# Patient Record
Sex: MALE | Race: WHITE | NOT HISPANIC OR LATINO | URBAN - METROPOLITAN AREA
[De-identification: names, ages, dates, MRNs, and addresses within clinical notes are randomized per-mention and may not be internally consistent; named-entity substitution may affect disease eponyms.]

---

## 2017-11-09 ENCOUNTER — EMERGENCY (EMERGENCY)
Facility: HOSPITAL | Age: 72
LOS: 1 days | Discharge: HOME | End: 2017-11-09
Attending: INTERNAL MEDICINE | Admitting: INTERNAL MEDICINE
Payer: MEDICARE

## 2017-11-09 VITALS
SYSTOLIC BLOOD PRESSURE: 150 MMHG | HEART RATE: 83 BPM | RESPIRATION RATE: 16 BRPM | OXYGEN SATURATION: 97 % | TEMPERATURE: 98 F | HEIGHT: 70 IN | DIASTOLIC BLOOD PRESSURE: 79 MMHG | WEIGHT: 190.04 LBS

## 2017-11-09 DIAGNOSIS — Z98.89 OTHER SPECIFIED POSTPROCEDURAL STATES: Chronic | ICD-10-CM

## 2017-11-09 PROCEDURE — 99283 EMERGENCY DEPT VISIT LOW MDM: CPT | Mod: 25

## 2017-11-09 NOTE — ED ADULT NURSE NOTE - PMH
Anxiety    Childhood asthma  resolved  Elbow effusion  left  GERD (gastroesophageal reflux disease)    Hypercholesteremia    Hyperlipidemia    Insomnia    Seasonal allergic rhinitis

## 2017-11-10 PROCEDURE — 74020: CPT | Mod: 26

## 2017-11-10 PROCEDURE — 99283 EMERGENCY DEPT VISIT LOW MDM: CPT | Mod: 25

## 2017-11-10 PROCEDURE — 74020: CPT

## 2017-11-10 NOTE — ED PROVIDER NOTE - FAMILY HISTORY
Father  Still living? Yes, Estimated age:   Family history of dementia, Age at diagnosis: Age Unknown     Mother  Still living? Yes, Estimated age:   Family history of hypertension, Age at diagnosis: Age Unknown     Sibling  Still living? Yes, Estimated age: 61-70  Family history of depression, Age at diagnosis: Age Unknown

## 2017-11-10 NOTE — ED PROVIDER NOTE - OBJECTIVE STATEMENT
70 y/o w/o with difficulty moving his bowels after holding his bowels after a long drive, he feels impacted, no abdominal pain, no N/V, no GIB. Did not get good result with fleets

## 2017-11-10 NOTE — ED PROVIDER NOTE - PSH
S/P colonoscopy  2 months ago  S/P knee surgery  left knee 2000  right knee 1997 & 2002  S/P shoulder surgery  2009 rotator cuff and repeat 2010 post injury  S/P tonsillectomy and adenoidectomy  as a child

## 2018-03-07 NOTE — ED PROVIDER NOTE - CROS ED ROS STATEMENT
1. Have you been to the ER, urgent care clinic, or been hospitalized since your last visit? No     2. Have you seen or consulted any other health care providers outside of the [address] [place] [address] since your last visit?    No       Reviewed record in preparation for visit and have necessary documentation  opportunity was given for questions  Goals that were addressed and/or need to be completed during or after this appointment include     Health Maintenance Due   Topic Date Due    DTaP/Tdap/Td series (1 - Tdap) 02/22/2002    PAP AKA CERVICAL CYTOLOGY  02/22/2002    Influenza Age 9 to Adult  08/01/2017 all other ROS negative except as per HPI

## 2018-08-20 NOTE — ED PROVIDER NOTE - PRINCIPAL DIAGNOSIS

## 2019-01-17 ENCOUNTER — APPOINTMENT (OUTPATIENT)
Dept: CARDIOLOGY | Facility: CLINIC | Age: 74
End: 2019-01-17

## 2020-07-27 DIAGNOSIS — Z78.9 OTHER SPECIFIED HEALTH STATUS: ICD-10-CM

## 2020-07-27 DIAGNOSIS — J45.909 UNSPECIFIED ASTHMA, UNCOMPLICATED: ICD-10-CM

## 2020-07-27 RX ORDER — LEVOCETIRIZINE DIHYDROCHLORIDE 5 MG/1
5 TABLET ORAL
Refills: 0 | Status: ACTIVE | COMMUNITY

## 2020-07-27 RX ORDER — ZOLPIDEM TARTRATE 12.5 MG/1
12.5 TABLET, EXTENDED RELEASE ORAL
Refills: 0 | Status: ACTIVE | COMMUNITY

## 2020-07-27 RX ORDER — OMEPRAZOLE 40 MG/1
40 CAPSULE, DELAYED RELEASE ORAL
Refills: 0 | Status: ACTIVE | COMMUNITY

## 2020-07-27 RX ORDER — CYCLOBENZAPRINE HYDROCHLORIDE 10 MG/1
10 TABLET, FILM COATED ORAL
Refills: 0 | Status: ACTIVE | COMMUNITY

## 2020-08-06 ENCOUNTER — APPOINTMENT (OUTPATIENT)
Dept: SURGERY | Facility: CLINIC | Age: 75
End: 2020-08-06
Payer: MEDICARE

## 2020-08-06 VITALS — TEMPERATURE: 97.2 F

## 2020-08-06 DIAGNOSIS — R79.89 OTHER SPECIFIED ABNORMAL FINDINGS OF BLOOD CHEMISTRY: ICD-10-CM

## 2020-08-06 PROCEDURE — 99214 OFFICE O/P EST MOD 30 MIN: CPT

## 2020-08-06 NOTE — PHYSICAL EXAM
[JVD] : no jugular venous distention  [Carotid Bruits] : no carotid bruits [Normal Heart Sounds] : normal heart sounds [Normal Breath Sounds] : Normal breath sounds [Abdominal Masses] : No abdominal masses [Abdomen Tenderness] : ~T ~M No abdominal tenderness [Enlarged] : not enlarged [Tender] : was nontender [Stool Sample Taken] : A stool sample was obtained  [Occult Blood Positive] : was negative for occult blood [Gross Blood] : no gross blood [Fecal Impaction] : no fecal impaction [Alert] : alert [No Rash or Lesion] : No rash or lesion [Oriented to Time] : oriented to time [Oriented to Place] : oriented to place [Calm] : calm [Oriented to Person] : oriented to person [de-identified] : benign [de-identified] : without adenopathy [de-identified] : noninjected and nonicteric [de-identified] : well developed white male in no acute distress [de-identified] : without masses [de-identified] : normal testicles, without masses, right inguinal hernia, left side normal

## 2020-08-06 NOTE — REVIEW OF SYSTEMS
[Dysuria] : no dysuria [Incontinence] : no incontinence [Hesitancy] : no urinary hesitancy [Nocturia] : no nocturia [Genital Lesion] : no genital lesions [Testicular Pain] : no testicular pain [Negative] : Heme/Lymph [FreeTextEntry8] : right groin pain

## 2020-08-06 NOTE — HISTORY OF PRESENT ILLNESS
[de-identified] : 74 year old white male with a few month history of right groin pain, c/o increased pain.  His pain was localized, without radiation.  It increased with activity.  Denies alleviating factors. [de-identified] : right groin pain

## 2020-08-17 ENCOUNTER — OUTPATIENT (OUTPATIENT)
Dept: OUTPATIENT SERVICES | Facility: HOSPITAL | Age: 75
LOS: 1 days | End: 2020-08-17
Payer: MEDICARE

## 2020-08-17 VITALS
SYSTOLIC BLOOD PRESSURE: 99 MMHG | RESPIRATION RATE: 16 BRPM | HEART RATE: 67 BPM | HEIGHT: 70 IN | OXYGEN SATURATION: 98 % | TEMPERATURE: 98 F | DIASTOLIC BLOOD PRESSURE: 62 MMHG | WEIGHT: 166.01 LBS

## 2020-08-17 DIAGNOSIS — K40.90 UNILATERAL INGUINAL HERNIA, WITHOUT OBSTRUCTION OR GANGRENE, NOT SPECIFIED AS RECURRENT: ICD-10-CM

## 2020-08-17 DIAGNOSIS — Z98.89 OTHER SPECIFIED POSTPROCEDURAL STATES: Chronic | ICD-10-CM

## 2020-08-17 DIAGNOSIS — Z98.890 OTHER SPECIFIED POSTPROCEDURAL STATES: Chronic | ICD-10-CM

## 2020-08-17 DIAGNOSIS — Z01.818 ENCOUNTER FOR OTHER PREPROCEDURAL EXAMINATION: ICD-10-CM

## 2020-08-17 LAB
ANION GAP SERPL CALC-SCNC: 8 MMOL/L — SIGNIFICANT CHANGE UP (ref 5–17)
APPEARANCE UR: CLEAR — SIGNIFICANT CHANGE UP
BILIRUB UR-MCNC: NEGATIVE — SIGNIFICANT CHANGE UP
BUN SERPL-MCNC: 39 MG/DL — HIGH (ref 7–23)
CALCIUM SERPL-MCNC: 9.5 MG/DL — SIGNIFICANT CHANGE UP (ref 8.5–10.1)
CHLORIDE SERPL-SCNC: 103 MMOL/L — SIGNIFICANT CHANGE UP (ref 96–108)
CO2 SERPL-SCNC: 28 MMOL/L — SIGNIFICANT CHANGE UP (ref 22–31)
COLOR SPEC: YELLOW — SIGNIFICANT CHANGE UP
CREAT SERPL-MCNC: 1.7 MG/DL — HIGH (ref 0.5–1.3)
DIFF PNL FLD: NEGATIVE — SIGNIFICANT CHANGE UP
GLUCOSE SERPL-MCNC: 104 MG/DL — HIGH (ref 70–99)
GLUCOSE UR QL: NEGATIVE — SIGNIFICANT CHANGE UP
HCT VFR BLD CALC: 36.6 % — LOW (ref 39–50)
HGB BLD-MCNC: 12.4 G/DL — LOW (ref 13–17)
KETONES UR-MCNC: NEGATIVE — SIGNIFICANT CHANGE UP
LEUKOCYTE ESTERASE UR-ACNC: NEGATIVE — SIGNIFICANT CHANGE UP
MCHC RBC-ENTMCNC: 30.2 PG — SIGNIFICANT CHANGE UP (ref 27–34)
MCHC RBC-ENTMCNC: 33.9 GM/DL — SIGNIFICANT CHANGE UP (ref 32–36)
MCV RBC AUTO: 89.3 FL — SIGNIFICANT CHANGE UP (ref 80–100)
NITRITE UR-MCNC: NEGATIVE — SIGNIFICANT CHANGE UP
NRBC # BLD: 0 /100 WBCS — SIGNIFICANT CHANGE UP (ref 0–0)
PH UR: 5 — SIGNIFICANT CHANGE UP (ref 5–8)
PLATELET # BLD AUTO: 262 K/UL — SIGNIFICANT CHANGE UP (ref 150–400)
POTASSIUM SERPL-MCNC: 4.6 MMOL/L — SIGNIFICANT CHANGE UP (ref 3.5–5.3)
POTASSIUM SERPL-SCNC: 4.6 MMOL/L — SIGNIFICANT CHANGE UP (ref 3.5–5.3)
PROT UR-MCNC: NEGATIVE — SIGNIFICANT CHANGE UP
RBC # BLD: 4.1 M/UL — LOW (ref 4.2–5.8)
RBC # FLD: 13.6 % — SIGNIFICANT CHANGE UP (ref 10.3–14.5)
SODIUM SERPL-SCNC: 139 MMOL/L — SIGNIFICANT CHANGE UP (ref 135–145)
SP GR SPEC: 1.02 — SIGNIFICANT CHANGE UP (ref 1.01–1.02)
UROBILINOGEN FLD QL: NEGATIVE — SIGNIFICANT CHANGE UP
WBC # BLD: 8.3 K/UL — SIGNIFICANT CHANGE UP (ref 3.8–10.5)
WBC # FLD AUTO: 8.3 K/UL — SIGNIFICANT CHANGE UP (ref 3.8–10.5)

## 2020-08-17 PROCEDURE — 86901 BLOOD TYPING SEROLOGIC RH(D): CPT

## 2020-08-17 PROCEDURE — 93005 ELECTROCARDIOGRAM TRACING: CPT

## 2020-08-17 PROCEDURE — G0463: CPT

## 2020-08-17 PROCEDURE — 85027 COMPLETE CBC AUTOMATED: CPT

## 2020-08-17 PROCEDURE — 80048 BASIC METABOLIC PNL TOTAL CA: CPT

## 2020-08-17 PROCEDURE — 86900 BLOOD TYPING SEROLOGIC ABO: CPT

## 2020-08-17 PROCEDURE — 86850 RBC ANTIBODY SCREEN: CPT

## 2020-08-17 PROCEDURE — 81003 URINALYSIS AUTO W/O SCOPE: CPT

## 2020-08-17 PROCEDURE — 36415 COLL VENOUS BLD VENIPUNCTURE: CPT

## 2020-08-17 PROCEDURE — 93010 ELECTROCARDIOGRAM REPORT: CPT

## 2020-08-17 RX ORDER — GEMFIBROZIL 600 MG
1 TABLET ORAL
Qty: 0 | Refills: 0 | DISCHARGE

## 2020-08-17 RX ORDER — OMEPRAZOLE 10 MG/1
1 CAPSULE, DELAYED RELEASE ORAL
Qty: 0 | Refills: 0 | DISCHARGE

## 2020-08-17 NOTE — H&P PST ADULT - NSICDXPROBLEM_GEN_ALL_CORE_FT
PROBLEM DIAGNOSES  Problem: Unilateral inguinal hernia, without obstruction or gangrene, not specified as recurrent  Assessment and Plan: scheduled for a laparoscopic right inguinal hernia repair on 9/4 with Dr. Roberts.       Problem: Pre-op evaluation  Assessment and Plan: Labs - CBC, BMP, UA, T&S and EKG. To be swabbed for COVID @ [facility]. Appt DANYELLED     with Sandro Polk. CC with Dr. Rowe  Pre op and Hibiclens instructions reviewed and given. Take routine am meds DOS with sip of water. Instructed to avoid NSAIDs and OTC supplements. Verbalized understanding

## 2020-08-17 NOTE — H&P PST ADULT - NSICDXPASTMEDICALHX_GEN_ALL_CORE_FT
PAST MEDICAL HISTORY:  Anxiety     Childhood asthma resolved    Elbow effusion left    GERD (gastroesophageal reflux disease)     Hypercholesteremia     Hyperlipidemia     Insomnia     Seasonal allergic rhinitis     Unilateral inguinal hernia, without obstruction or gangrene, not specified as recurrent

## 2020-08-17 NOTE — H&P PST ADULT - NSICDXFAMILYHX_GEN_ALL_CORE_FT
FAMILY HISTORY:  Father  Still living? Yes, Estimated age:   Family history of dementia, Age at diagnosis: Age Unknown    Mother  Still living? Yes, Estimated age:   Family history of hypertension, Age at diagnosis: Age Unknown    Sibling  Still living? Yes, Estimated age: 61-70  Family history of depression, Age at diagnosis: Age Unknown

## 2020-08-17 NOTE — H&P PST ADULT - HISTORY OF PRESENT ILLNESS
75 yo male with HTN, HLD GERD and Seasonal allergies and Asthma, presents to PST scheduled for a laparoscopic right inguinal hernia repair on 9/4 with Dr. Roberts. C/O right inguinal pain for a couple of months. Denies swelling, abd pain, N/V and urinary symptomology.

## 2020-08-17 NOTE — H&P PST ADULT - VISION (WITH CORRECTIVE LENSES IF THE PATIENT USUALLY WEARS THEM):
+ reading glasses/Partially impaired: cannot see medication labels or newsprint, but can see obstacles in path, and the surrounding layout; can count fingers at arm's length

## 2020-08-17 NOTE — H&P PST ADULT - NSICDXPASTSURGICALHX_GEN_ALL_CORE_FT
PAST SURGICAL HISTORY:  H/O elbow surgery 2015    S/P colonoscopy > 5 years ago    S/P knee surgery left knee 2000  right knee 1997 & 2002    S/P shoulder surgery 2009 rotator cuff and repeat 2010 post injury    S/P tonsillectomy and adenoidectomy as a child

## 2020-09-01 PROBLEM — K40.90 UNILATERAL INGUINAL HERNIA, WITHOUT OBSTRUCTION OR GANGRENE, NOT SPECIFIED AS RECURRENT: Chronic | Status: ACTIVE | Noted: 2020-08-17

## 2020-09-02 ENCOUNTER — OUTPATIENT (OUTPATIENT)
Dept: OUTPATIENT SERVICES | Facility: HOSPITAL | Age: 75
LOS: 1 days | End: 2020-09-02
Payer: MEDICARE

## 2020-09-02 DIAGNOSIS — Z98.89 OTHER SPECIFIED POSTPROCEDURAL STATES: Chronic | ICD-10-CM

## 2020-09-02 DIAGNOSIS — Z11.59 ENCOUNTER FOR SCREENING FOR OTHER VIRAL DISEASES: ICD-10-CM

## 2020-09-02 DIAGNOSIS — Z98.890 OTHER SPECIFIED POSTPROCEDURAL STATES: Chronic | ICD-10-CM

## 2020-09-02 LAB — SARS-COV-2 RNA SPEC QL NAA+PROBE: SIGNIFICANT CHANGE UP

## 2020-09-02 PROCEDURE — U0003: CPT

## 2020-09-03 ENCOUNTER — TRANSCRIPTION ENCOUNTER (OUTPATIENT)
Age: 75
End: 2020-09-03

## 2020-09-03 NOTE — ASU PATIENT PROFILE, ADULT - PSH
H/O elbow surgery  2015  S/P colonoscopy  > 5 years ago  S/P knee surgery  left knee 2000  right knee 1997 & 2002  S/P shoulder surgery  2009 rotator cuff and repeat 2010 post injury  S/P tonsillectomy and adenoidectomy  as a child

## 2020-09-03 NOTE — ASU PATIENT PROFILE, ADULT - VISION (WITH CORRECTIVE LENSES IF THE PATIENT USUALLY WEARS THEM):
Partially impaired: cannot see medication labels or newsprint, but can see obstacles in path, and the surrounding layout; can count fingers at arm's length/+ reading glasses

## 2020-09-03 NOTE — ASU PATIENT PROFILE, ADULT - PMH
Anxiety    Childhood asthma  resolved  Elbow effusion  left  GERD (gastroesophageal reflux disease)    Hypercholesteremia    Hyperlipidemia    Insomnia    Seasonal allergic rhinitis    Unilateral inguinal hernia, without obstruction or gangrene, not specified as recurrent

## 2020-09-04 ENCOUNTER — RESULT REVIEW (OUTPATIENT)
Age: 75
End: 2020-09-04

## 2020-09-04 ENCOUNTER — OUTPATIENT (OUTPATIENT)
Dept: OUTPATIENT SERVICES | Facility: HOSPITAL | Age: 75
LOS: 1 days | End: 2020-09-04
Payer: MEDICARE

## 2020-09-04 ENCOUNTER — APPOINTMENT (OUTPATIENT)
Dept: SURGERY | Facility: HOSPITAL | Age: 75
End: 2020-09-04

## 2020-09-04 VITALS
TEMPERATURE: 98 F | WEIGHT: 166.01 LBS | OXYGEN SATURATION: 99 % | DIASTOLIC BLOOD PRESSURE: 53 MMHG | RESPIRATION RATE: 14 BRPM | SYSTOLIC BLOOD PRESSURE: 134 MMHG | HEIGHT: 70 IN | HEART RATE: 72 BPM

## 2020-09-04 VITALS
DIASTOLIC BLOOD PRESSURE: 58 MMHG | SYSTOLIC BLOOD PRESSURE: 138 MMHG | HEART RATE: 71 BPM | OXYGEN SATURATION: 100 % | RESPIRATION RATE: 15 BRPM

## 2020-09-04 DIAGNOSIS — Z98.89 OTHER SPECIFIED POSTPROCEDURAL STATES: Chronic | ICD-10-CM

## 2020-09-04 DIAGNOSIS — Z98.890 OTHER SPECIFIED POSTPROCEDURAL STATES: Chronic | ICD-10-CM

## 2020-09-04 DIAGNOSIS — K40.90 UNILATERAL INGUINAL HERNIA, WITHOUT OBSTRUCTION OR GANGRENE, NOT SPECIFIED AS RECURRENT: ICD-10-CM

## 2020-09-04 PROCEDURE — C1781: CPT

## 2020-09-04 PROCEDURE — 88304 TISSUE EXAM BY PATHOLOGIST: CPT

## 2020-09-04 PROCEDURE — 55520 REMOVAL OF SPERM CORD LESION: CPT | Mod: AS,59

## 2020-09-04 PROCEDURE — 49650 LAP ING HERNIA REPAIR INIT: CPT | Mod: RT

## 2020-09-04 PROCEDURE — 88304 TISSUE EXAM BY PATHOLOGIST: CPT | Mod: 26

## 2020-09-04 PROCEDURE — 49650 LAP ING HERNIA REPAIR INIT: CPT

## 2020-09-04 PROCEDURE — C1889: CPT

## 2020-09-04 PROCEDURE — 49650 LAP ING HERNIA REPAIR INIT: CPT | Mod: AS

## 2020-09-04 RX ORDER — DOCUSATE SODIUM 100 MG/1
100 CAPSULE ORAL 3 TIMES DAILY
Qty: 30 | Refills: 0 | Status: ACTIVE | COMMUNITY
Start: 2020-09-04 | End: 1900-01-01

## 2020-09-04 RX ORDER — SODIUM CHLORIDE 9 MG/ML
1000 INJECTION, SOLUTION INTRAVENOUS
Refills: 0 | Status: DISCONTINUED | OUTPATIENT
Start: 2020-09-04 | End: 2020-09-04

## 2020-09-04 RX ORDER — OXYCODONE HYDROCHLORIDE 5 MG/1
5 TABLET ORAL ONCE
Refills: 0 | Status: DISCONTINUED | OUTPATIENT
Start: 2020-09-04 | End: 2020-09-04

## 2020-09-04 RX ORDER — TAMSULOSIN HYDROCHLORIDE 0.4 MG/1
0.4 CAPSULE ORAL ONCE
Refills: 0 | Status: COMPLETED | OUTPATIENT
Start: 2020-09-04 | End: 2020-09-04

## 2020-09-04 RX ORDER — IBUPROFEN 200 MG
1 TABLET ORAL
Qty: 30 | Refills: 0
Start: 2020-09-04

## 2020-09-04 RX ORDER — CEFAZOLIN SODIUM 1 G
2000 VIAL (EA) INJECTION ONCE
Refills: 0 | Status: COMPLETED | OUTPATIENT
Start: 2020-09-04 | End: 2020-09-04

## 2020-09-04 RX ORDER — HYDROMORPHONE HYDROCHLORIDE 2 MG/ML
0.5 INJECTION INTRAMUSCULAR; INTRAVENOUS; SUBCUTANEOUS
Refills: 0 | Status: DISCONTINUED | OUTPATIENT
Start: 2020-09-04 | End: 2020-09-04

## 2020-09-04 RX ORDER — ONDANSETRON 8 MG/1
4 TABLET, FILM COATED ORAL ONCE
Refills: 0 | Status: DISCONTINUED | OUTPATIENT
Start: 2020-09-04 | End: 2020-09-04

## 2020-09-04 RX ADMIN — SODIUM CHLORIDE 75 MILLILITER(S): 9 INJECTION, SOLUTION INTRAVENOUS at 14:30

## 2020-09-04 RX ADMIN — OXYCODONE HYDROCHLORIDE 5 MILLIGRAM(S): 5 TABLET ORAL at 14:41

## 2020-09-04 RX ADMIN — TAMSULOSIN HYDROCHLORIDE 0.4 MILLIGRAM(S): 0.4 CAPSULE ORAL at 14:40

## 2020-09-04 RX ADMIN — OXYCODONE HYDROCHLORIDE 5 MILLIGRAM(S): 5 TABLET ORAL at 14:36

## 2020-09-04 RX ADMIN — SODIUM CHLORIDE 75 MILLILITER(S): 9 INJECTION, SOLUTION INTRAVENOUS at 11:03

## 2020-09-04 NOTE — ASU DISCHARGE PLAN (ADULT/PEDIATRIC) - ASU DC SPECIAL INSTRUCTIONSFT
Keep dressing clean, dry and intact x 48 hrs. Apply waterproof ice pack to incision area 20 mins on, 20 mins off to help decrease pain and swelling. After 48 hrs you may begin showering as usual but Do NOT remove dressings. Do not scrub or soak incision site. Pat dry. NO tub baths, NO swimming pools.     Call Dr. Roberts's office for an appointment for follow up in 1 week

## 2020-09-04 NOTE — ASU DISCHARGE PLAN (ADULT/PEDIATRIC) - CARE PROVIDER_API CALL
Saul Roberts)  Surgery  [address] [place] [id number]  Phone: [phone]  Fax: [phone]  Follow Up Time:

## 2020-09-04 NOTE — BRIEF OPERATIVE NOTE - NSICDXBRIEFPROCEDURE_GEN_ALL_CORE_FT
PROCEDURES:  Laparoscopic repair, inguinal hernia 04-Sep-2020 13:49:01 Laparoscopic Right inguinal hernia repair with mesh Alejandra Matthews

## 2020-09-09 LAB — SURGICAL PATHOLOGY STUDY: SIGNIFICANT CHANGE UP

## 2020-09-10 ENCOUNTER — APPOINTMENT (OUTPATIENT)
Dept: SURGERY | Facility: CLINIC | Age: 75
End: 2020-09-10
Payer: MEDICARE

## 2020-09-10 VITALS — TEMPERATURE: 97.2 F

## 2020-09-10 PROCEDURE — 99024 POSTOP FOLLOW-UP VISIT: CPT

## 2020-09-10 NOTE — PHYSICAL EXAM
[Normal Breath Sounds] : Normal breath sounds [Normal Rate and Rhythm] : normal rate and rhythm [Normal Heart Sounds] : normal heart sounds [de-identified] : minimal swelling and ecchymosis [de-identified] : well developed male in no acute distress [de-identified] : normal bowel sounds, without distension, or tenderness, with ecchymosis around umbilicus\par Incisions clean and closed [de-identified] : without calf pain or swelling

## 2020-09-10 NOTE — HISTORY OF PRESENT ILLNESS
[de-identified] : s/p laparoscopic repair of right inguinal hernia 9/4/20 [de-identified] : pt doing well, with normal bowel movements x 2.  He denies fevers, chills or nights sweats.  Voiding without difficulty.

## 2020-10-08 ENCOUNTER — APPOINTMENT (OUTPATIENT)
Dept: SURGERY | Facility: CLINIC | Age: 75
End: 2020-10-08
Payer: MEDICARE

## 2020-10-08 VITALS — TEMPERATURE: 98 F

## 2020-10-08 DIAGNOSIS — K40.30 UNILATERAL INGUINAL HERNIA, WITH OBSTRUCTION, W/OUT GANGRENE, NOT SPECIFIED AS RECURRENT: ICD-10-CM

## 2020-10-08 PROCEDURE — 99024 POSTOP FOLLOW-UP VISIT: CPT

## 2020-10-08 NOTE — HISTORY OF PRESENT ILLNESS
[de-identified] : s/p laparoscopic repair of right inguinal hernia 9/4/20 [de-identified] : pt without complaints, minimal pain, normal Gi functioning. Denies fevers, chills or night sweats

## 2020-10-23 ENCOUNTER — OUTPATIENT (OUTPATIENT)
Dept: OUTPATIENT SERVICES | Facility: HOSPITAL | Age: 75
LOS: 1 days | End: 2020-10-23
Payer: MEDICARE

## 2020-10-23 VITALS
HEIGHT: 70 IN | RESPIRATION RATE: 16 BRPM | HEART RATE: 66 BPM | DIASTOLIC BLOOD PRESSURE: 84 MMHG | SYSTOLIC BLOOD PRESSURE: 138 MMHG | OXYGEN SATURATION: 99 % | TEMPERATURE: 98 F | WEIGHT: 160.94 LBS

## 2020-10-23 DIAGNOSIS — H26.9 UNSPECIFIED CATARACT: Chronic | ICD-10-CM

## 2020-10-23 DIAGNOSIS — Z98.890 OTHER SPECIFIED POSTPROCEDURAL STATES: Chronic | ICD-10-CM

## 2020-10-23 DIAGNOSIS — Z98.89 OTHER SPECIFIED POSTPROCEDURAL STATES: Chronic | ICD-10-CM

## 2020-10-23 DIAGNOSIS — Z01.818 ENCOUNTER FOR OTHER PREPROCEDURAL EXAMINATION: ICD-10-CM

## 2020-10-23 DIAGNOSIS — S46.112A STRAIN OF MUSCLE, FASCIA AND TENDON OF LONG HEAD OF BICEPS, LEFT ARM, INITIAL ENCOUNTER: ICD-10-CM

## 2020-10-23 LAB
ALBUMIN SERPL ELPH-MCNC: 3.9 G/DL — SIGNIFICANT CHANGE UP (ref 3.3–5)
ALP SERPL-CCNC: 34 U/L — LOW (ref 40–120)
ALT FLD-CCNC: 20 U/L — SIGNIFICANT CHANGE UP (ref 12–78)
ANION GAP SERPL CALC-SCNC: 7 MMOL/L — SIGNIFICANT CHANGE UP (ref 5–17)
AST SERPL-CCNC: 18 U/L — SIGNIFICANT CHANGE UP (ref 15–37)
BILIRUB SERPL-MCNC: 0.5 MG/DL — SIGNIFICANT CHANGE UP (ref 0.2–1.2)
BUN SERPL-MCNC: 27 MG/DL — HIGH (ref 7–23)
CALCIUM SERPL-MCNC: 9 MG/DL — SIGNIFICANT CHANGE UP (ref 8.5–10.1)
CHLORIDE SERPL-SCNC: 106 MMOL/L — SIGNIFICANT CHANGE UP (ref 96–108)
CO2 SERPL-SCNC: 27 MMOL/L — SIGNIFICANT CHANGE UP (ref 22–31)
CREAT SERPL-MCNC: 1.6 MG/DL — HIGH (ref 0.5–1.3)
GLUCOSE SERPL-MCNC: 101 MG/DL — HIGH (ref 70–99)
HCT VFR BLD CALC: 37.8 % — LOW (ref 39–50)
HGB BLD-MCNC: 12.3 G/DL — LOW (ref 13–17)
MCHC RBC-ENTMCNC: 29.8 PG — SIGNIFICANT CHANGE UP (ref 27–34)
MCHC RBC-ENTMCNC: 32.5 GM/DL — SIGNIFICANT CHANGE UP (ref 32–36)
MCV RBC AUTO: 91.5 FL — SIGNIFICANT CHANGE UP (ref 80–100)
NRBC # BLD: 0 /100 WBCS — SIGNIFICANT CHANGE UP (ref 0–0)
PLATELET # BLD AUTO: 261 K/UL — SIGNIFICANT CHANGE UP (ref 150–400)
POTASSIUM SERPL-MCNC: 4 MMOL/L — SIGNIFICANT CHANGE UP (ref 3.5–5.3)
POTASSIUM SERPL-SCNC: 4 MMOL/L — SIGNIFICANT CHANGE UP (ref 3.5–5.3)
PROT SERPL-MCNC: 7.4 G/DL — SIGNIFICANT CHANGE UP (ref 6–8.3)
RBC # BLD: 4.13 M/UL — LOW (ref 4.2–5.8)
RBC # FLD: 15.1 % — HIGH (ref 10.3–14.5)
SODIUM SERPL-SCNC: 140 MMOL/L — SIGNIFICANT CHANGE UP (ref 135–145)
WBC # BLD: 8.48 K/UL — SIGNIFICANT CHANGE UP (ref 3.8–10.5)
WBC # FLD AUTO: 8.48 K/UL — SIGNIFICANT CHANGE UP (ref 3.8–10.5)

## 2020-10-23 PROCEDURE — 85027 COMPLETE CBC AUTOMATED: CPT

## 2020-10-23 PROCEDURE — 80053 COMPREHEN METABOLIC PANEL: CPT

## 2020-10-23 PROCEDURE — 36415 COLL VENOUS BLD VENIPUNCTURE: CPT

## 2020-10-23 PROCEDURE — G0463: CPT

## 2020-10-23 RX ORDER — LISINOPRIL 2.5 MG/1
1 TABLET ORAL
Qty: 0 | Refills: 0 | DISCHARGE

## 2020-10-23 RX ORDER — SIMVASTATIN 20 MG/1
1 TABLET, FILM COATED ORAL
Qty: 0 | Refills: 0 | DISCHARGE

## 2020-10-23 NOTE — H&P PST ADULT - HISTORY OF PRESENT ILLNESS
73 yo male scheduled for left Shoulder Glhhgdhjjhw-Whqwvxzwh-Sgcmdw Debridement-Repair Rotator Cuff on 11/6/20 with Dr Bowens.  Patient states he has had pain left shoulder for 6Pain worse with movemen   right hand 75 yo male scheduled for Left Shoulder Lqyuxxhyjuz-Kjlqbfhbc-Fdgzxu Debridement-Repair Rotator Cuff on 11/6/20 with Dr Bowens.  Patient states he has had pain left shoulder for 6 months. Pain is worse with movement.  Patient is right hand dominant

## 2020-10-23 NOTE — H&P PST ADULT - NSICDXPASTSURGICALHX_GEN_ALL_CORE_FT
PAST SURGICAL HISTORY:  Cataract 2012 2013    H/O elbow surgery 2015    S/P colonoscopy > 5 years ago    S/P knee surgery left knee 2000  right knee 1997 & 2002    S/P shoulder surgery 2009 rotator cuff and repeat 2010 post injury right    S/P tonsillectomy and adenoidectomy as a child

## 2020-10-23 NOTE — H&P PST ADULT - NSICDXPASTMEDICALHX_GEN_ALL_CORE_FT
PAST MEDICAL HISTORY:  Anxiety denied    Childhood asthma resolved    Elbow effusion left    GERD (gastroesophageal reflux disease)     Hypercholesteremia     Hyperlipidemia     Insomnia     Seasonal allergic rhinitis     Unilateral inguinal hernia, without obstruction or gangrene, not specified as recurrent      PAST MEDICAL HISTORY:  Anxiety denied    Childhood asthma resolved    Elbow effusion left    GERD (gastroesophageal reflux disease)     Hypercholesteremia     Hyperlipidemia     Insomnia     Seasonal allergic rhinitis     Strain of muscle, fascia and tendon of long head of biceps, left arm, initial encounter     Unilateral inguinal hernia, without obstruction or gangrene, not specified as recurrent

## 2020-10-23 NOTE — H&P PST ADULT - VISION (WITH CORRECTIVE LENSES IF THE PATIENT USUALLY WEARS THEM):
Normal vision: sees adequately in most situations; can see medication labels, newsprint/+ reading glasses

## 2020-10-23 NOTE — H&P PST ADULT - ASSESSMENT
75 yo male scheduled for Left Shoulder Ozsqmmnohrt-Zqzaiqdcm-Vrdstv Debridement-Repair Rotator Cuff on 11/6/20 with Dr Bowens.

## 2020-10-23 NOTE — H&P PST ADULT - NS PRO PASSIVE SMOKE EXP
Called by CDU PA to see pt as he was confused.  It appears on review of prior charts and evaluating the patient that he was sundowning.  not appropriate for CDU.  Will admit for further evaluation No

## 2020-11-03 PROBLEM — S46.112A STRAIN OF MUSCLE, FASCIA AND TENDON OF LONG HEAD OF BICEPS, LEFT ARM, INITIAL ENCOUNTER: Chronic | Status: ACTIVE | Noted: 2020-10-23

## 2020-11-04 ENCOUNTER — OUTPATIENT (OUTPATIENT)
Dept: OUTPATIENT SERVICES | Facility: HOSPITAL | Age: 75
LOS: 1 days | End: 2020-11-04
Payer: MEDICARE

## 2020-11-04 DIAGNOSIS — Z98.89 OTHER SPECIFIED POSTPROCEDURAL STATES: Chronic | ICD-10-CM

## 2020-11-04 DIAGNOSIS — Z98.890 OTHER SPECIFIED POSTPROCEDURAL STATES: Chronic | ICD-10-CM

## 2020-11-04 DIAGNOSIS — H26.9 UNSPECIFIED CATARACT: Chronic | ICD-10-CM

## 2020-11-04 DIAGNOSIS — Z11.59 ENCOUNTER FOR SCREENING FOR OTHER VIRAL DISEASES: ICD-10-CM

## 2020-11-04 LAB — SARS-COV-2 RNA SPEC QL NAA+PROBE: SIGNIFICANT CHANGE UP

## 2020-11-04 PROCEDURE — U0003: CPT

## 2020-11-05 ENCOUNTER — TRANSCRIPTION ENCOUNTER (OUTPATIENT)
Age: 75
End: 2020-11-05

## 2020-11-05 NOTE — ASU PATIENT PROFILE, ADULT - NSCAFFEAMTFREQ_GEN_ALL_CORE_SD
[FreeTextEntry1] : pt c/o persistent pain while on pain meds. also states of increasing anxiety and shaking of hands.>Requests to increase pain meds [1] : 2) Feeling down, depressed, or hopeless for several days 1-2 cups/cans per day

## 2020-11-05 NOTE — ASU PATIENT PROFILE, ADULT - NSSUBSTANCEUSE_GEN_ALL_CORE_SD
----- Message from July Vasquez MD sent at 3/4/2019  1:53 PM CST -----  Poor sugar control. please endo follow up  
Plan reviewed with patient .  Endocrine consult is pending.  
street drug/inhalant/medication abuse/caffeine

## 2020-11-06 ENCOUNTER — RESULT REVIEW (OUTPATIENT)
Age: 75
End: 2020-11-06

## 2020-11-06 ENCOUNTER — OUTPATIENT (OUTPATIENT)
Dept: OUTPATIENT SERVICES | Facility: HOSPITAL | Age: 75
LOS: 1 days | End: 2020-11-06
Payer: MEDICARE

## 2020-11-06 VITALS
DIASTOLIC BLOOD PRESSURE: 51 MMHG | SYSTOLIC BLOOD PRESSURE: 128 MMHG | HEIGHT: 70 IN | RESPIRATION RATE: 18 BRPM | OXYGEN SATURATION: 99 % | HEART RATE: 45 BPM | WEIGHT: 160.06 LBS | TEMPERATURE: 98 F

## 2020-11-06 VITALS
SYSTOLIC BLOOD PRESSURE: 126 MMHG | HEART RATE: 61 BPM | DIASTOLIC BLOOD PRESSURE: 67 MMHG | TEMPERATURE: 98 F | RESPIRATION RATE: 17 BRPM | OXYGEN SATURATION: 97 %

## 2020-11-06 DIAGNOSIS — Z98.89 OTHER SPECIFIED POSTPROCEDURAL STATES: Chronic | ICD-10-CM

## 2020-11-06 DIAGNOSIS — H26.9 UNSPECIFIED CATARACT: Chronic | ICD-10-CM

## 2020-11-06 DIAGNOSIS — S46.112A STRAIN OF MUSCLE, FASCIA AND TENDON OF LONG HEAD OF BICEPS, LEFT ARM, INITIAL ENCOUNTER: ICD-10-CM

## 2020-11-06 DIAGNOSIS — Z98.890 OTHER SPECIFIED POSTPROCEDURAL STATES: Chronic | ICD-10-CM

## 2020-11-06 PROCEDURE — C1713: CPT

## 2020-11-06 PROCEDURE — 88304 TISSUE EXAM BY PATHOLOGIST: CPT

## 2020-11-06 PROCEDURE — 29826 SHO ARTHRS SRG DECOMPRESSION: CPT | Mod: LT

## 2020-11-06 PROCEDURE — 29823 SHO ARTHRS SRG XTNSV DBRDMT: CPT | Mod: LT

## 2020-11-06 PROCEDURE — C1763: CPT

## 2020-11-06 PROCEDURE — 29827 SHO ARTHRS SRG RT8TR CUF RPR: CPT | Mod: LT

## 2020-11-06 PROCEDURE — C1889: CPT

## 2020-11-06 PROCEDURE — 29821 SHO ARTHRS SRG COMPL SYNVCT: CPT | Mod: XS,LT

## 2020-11-06 PROCEDURE — 88304 TISSUE EXAM BY PATHOLOGIST: CPT | Mod: 26

## 2020-11-06 RX ORDER — ACETAMINOPHEN 500 MG
1000 TABLET ORAL ONCE
Refills: 0 | Status: COMPLETED | OUTPATIENT
Start: 2020-11-06 | End: 2020-11-06

## 2020-11-06 RX ORDER — ONDANSETRON 8 MG/1
4 TABLET, FILM COATED ORAL ONCE
Refills: 0 | Status: DISCONTINUED | OUTPATIENT
Start: 2020-11-06 | End: 2020-11-06

## 2020-11-06 RX ORDER — OXYCODONE HYDROCHLORIDE 5 MG/1
1 TABLET ORAL
Qty: 25 | Refills: 0
Start: 2020-11-06

## 2020-11-06 RX ORDER — ATENOLOL 25 MG/1
1 TABLET ORAL
Qty: 0 | Refills: 0 | DISCHARGE

## 2020-11-06 RX ORDER — ATORVASTATIN CALCIUM 80 MG/1
1 TABLET, FILM COATED ORAL
Qty: 0 | Refills: 0 | DISCHARGE

## 2020-11-06 RX ORDER — OXYCODONE HYDROCHLORIDE 5 MG/1
5 TABLET ORAL ONCE
Refills: 0 | Status: DISCONTINUED | OUTPATIENT
Start: 2020-11-06 | End: 2020-11-06

## 2020-11-06 RX ORDER — FENOFIBRATE,MICRONIZED 130 MG
1 CAPSULE ORAL
Qty: 0 | Refills: 0 | DISCHARGE

## 2020-11-06 RX ORDER — ZOLPIDEM TARTRATE 10 MG/1
1 TABLET ORAL
Qty: 0 | Refills: 0 | DISCHARGE

## 2020-11-06 RX ORDER — SODIUM CHLORIDE 9 MG/ML
1000 INJECTION, SOLUTION INTRAVENOUS
Refills: 0 | Status: DISCONTINUED | OUTPATIENT
Start: 2020-11-06 | End: 2020-11-06

## 2020-11-06 RX ORDER — DOCUSATE SODIUM 100 MG
1 CAPSULE ORAL
Qty: 0 | Refills: 0 | DISCHARGE

## 2020-11-06 RX ORDER — LEVOCETIRIZINE DIHYDROCHLORIDE 0.5 MG/ML
1 SOLUTION ORAL
Qty: 0 | Refills: 0 | DISCHARGE

## 2020-11-06 RX ORDER — CYCLOBENZAPRINE HYDROCHLORIDE 10 MG/1
1 TABLET, FILM COATED ORAL
Qty: 0 | Refills: 0 | DISCHARGE

## 2020-11-06 RX ORDER — OMEPRAZOLE 10 MG/1
1 CAPSULE, DELAYED RELEASE ORAL
Qty: 0 | Refills: 0 | DISCHARGE

## 2020-11-06 RX ORDER — CEFAZOLIN SODIUM 1 G
2000 VIAL (EA) INJECTION ONCE
Refills: 0 | Status: COMPLETED | OUTPATIENT
Start: 2020-11-06 | End: 2020-11-06

## 2020-11-06 RX ORDER — HYDROMORPHONE HYDROCHLORIDE 2 MG/ML
0.5 INJECTION INTRAMUSCULAR; INTRAVENOUS; SUBCUTANEOUS
Refills: 0 | Status: DISCONTINUED | OUTPATIENT
Start: 2020-11-06 | End: 2020-11-06

## 2020-11-06 RX ORDER — MORPHINE SULFATE 50 MG/1
4 CAPSULE, EXTENDED RELEASE ORAL ONCE
Refills: 0 | Status: DISCONTINUED | OUTPATIENT
Start: 2020-11-06 | End: 2020-11-06

## 2020-11-06 RX ADMIN — SODIUM CHLORIDE 75 MILLILITER(S): 9 INJECTION, SOLUTION INTRAVENOUS at 10:26

## 2020-11-06 RX ADMIN — Medication 400 MILLIGRAM(S): at 10:27

## 2020-11-06 RX ADMIN — SODIUM CHLORIDE 75 MILLILITER(S): 9 INJECTION, SOLUTION INTRAVENOUS at 07:10

## 2020-11-06 NOTE — PROGRESS NOTE ADULT - SUBJECTIVE AND OBJECTIVE BOX
s/p shoulder surgery with brachial plexus block  good relief with no reported complications    will follow as needed

## 2020-11-06 NOTE — BRIEF OPERATIVE NOTE - NSICDXBRIEFPOSTOP_GEN_ALL_CORE_FT
POST-OP DIAGNOSIS:  Left rotator cuff tear 06-Nov-2020 10:22:51 partial-thickness Mitchell Boyd  Labral tear of shoulder, degenerative, left 06-Nov-2020 10:22:10  Mitchell Boyd  Shoulder impingement syndrome, left 06-Nov-2020 10:21:58  Mitchell Boyd  Glenohumeral arthritis 06-Nov-2020 10:21:44  Mitchell Boyd

## 2020-11-06 NOTE — ASU DISCHARGE PLAN (ADULT/PEDIATRIC) - CARE PROVIDER_API CALL
Sebastian Bowens  ORTHOPAEDIC SURGERY  [address] [place] [address]  Phone: [phone]  Fax: [phone]  Follow Up Time:

## 2020-11-06 NOTE — ASU DISCHARGE PLAN (ADULT/PEDIATRIC) - PROVIDER TOKENS
Patient, Александр Betts calling for medication refill. Medication(s) set up as pending orders from medication list.    Caller has been advised that their call does not guarantee an immediate refill. This refill will be reviewed within 24-72 hours by a qualified provider who will determine whether he or she can refill the medication.    Patient has contacted the pharmacy?  Yes    Additional information:     Patient is completely out of medications    Patient’s preferred pharmacy has been noted and populated.       [id number] - [place], [place] - [address]  [address]  [address]  [facility] [address]  Phone: [phone] Fax: [phone]       PROVIDER:[TOKEN:[[id number]]]

## 2020-11-06 NOTE — ASU DISCHARGE PLAN (ADULT/PEDIATRIC) - ASU DC SPECIAL INSTRUCTIONSFT
Keep right shoulder dressings clean and dry.      Do not use/move the left shoulder.  It should remain in the sling and waist pillow, except for showering (see below).  You may move the fingers/hand/wrist, and you may periodically slip your arm out of the sling and bend/straighten your elbow for comfort/prevent stiffness.      Do not drive.     Keep dressings in place, do not remove - you may shower with them still in place as they are waterproof.  You may shower in 24 hours.  DO NOT USE LEFT SHOULDER WHILE IN THE SHOWER - let it dangle at your side.  Do not let the stream of water hit the wounds directly, do not rub incisions.  Pat dressings dry after the shower.  Put the waist pillow and sling back on directly after the shower.      It will be more comfortable to sleep in a recliner, or in bed with multiple pillows behind you to prop you up.  Lying flat will be extremely uncomfortable/painful.      Ice the shoulder 20 min on/20 min off for 2-3 hours twice a day.        Pain medication regimen, to be taken in this order:  1. You may take over-the-counter Extra-Strength Tylenol for mild pain (500mg tablets, take 2 every 6 hours as needed).  2. You may take over-the counter 200mg Motrin for moderate pain (take 2-3 pills every 6 hours with food).   3. You may take Oxycodone as prescribed (1-2 caplets every 4-6 hours as needed for severe pain that is not controlled by the Tylenol or Motrin).    **You can take all three of these medications together if needed, but try to stagger the Tylenol and Motrin - take about an hour apart.    Follow-up with Dr. Bowens in his office next week - call office for appointment if not already made (see below).

## 2020-11-06 NOTE — BRIEF OPERATIVE NOTE - OPERATION/FINDINGS
1. Extensive degenerative fraying/tearing of the glenoid labrum globally.  2. Grade II-III degenerative changes of the articular surfaces of the humeral head and glenoid socket.  3. Subacromial osteophyte formation with downsloping acromion.  4. Beefy/hypertrophied subacromial/subdeltoid bursa.  5. Articular-sided partial thickness rotator cuff (Supraspinatus) tear at insertion on humeral head

## 2020-11-06 NOTE — BRIEF OPERATIVE NOTE - COMMENTS
Interscalene nerve block utilizing sonographic guidance performed pre-op by anesthesia for intra and post-op analgesia

## 2020-11-06 NOTE — BRIEF OPERATIVE NOTE - NSICDXBRIEFPROCEDURE_GEN_ALL_CORE_FT
PROCEDURES:  Left shoulder arthroscopy 06-Nov-2020 10:23:16 with: 1. debridement of glenoid labrum and capsulorrhaphy; 2. subacromial decompression; 3. debridement humeral head articular cartilage; 4. [facility] bio-inductive patch placement Mitchell Boyd

## 2020-11-09 LAB — SURGICAL PATHOLOGY STUDY: SIGNIFICANT CHANGE UP

## 2021-06-03 NOTE — ASU PATIENT PROFILE, ADULT - BLOOD AVOIDANCE/RESTRICTIONS, PROFILE
Improving, recently underwent resection of metastatic sites, following with surgery and oncology, has follow-up scheduled for monitoring with serial imaging   none

## 2021-09-29 ENCOUNTER — APPOINTMENT (OUTPATIENT)
Dept: CARDIOLOGY | Facility: CLINIC | Age: 76
End: 2021-09-29
Payer: MEDICARE

## 2021-09-29 ENCOUNTER — NON-APPOINTMENT (OUTPATIENT)
Age: 76
End: 2021-09-29

## 2021-09-29 VITALS — DIASTOLIC BLOOD PRESSURE: 70 MMHG | SYSTOLIC BLOOD PRESSURE: 122 MMHG

## 2021-09-29 VITALS
HEART RATE: 48 BPM | HEIGHT: 70 IN | BODY MASS INDEX: 24.91 KG/M2 | OXYGEN SATURATION: 98 % | WEIGHT: 174 LBS | DIASTOLIC BLOOD PRESSURE: 76 MMHG | SYSTOLIC BLOOD PRESSURE: 151 MMHG

## 2021-09-29 DIAGNOSIS — Z00.00 ENCOUNTER FOR GENERAL ADULT MEDICAL EXAMINATION W/OUT ABNORMAL FINDINGS: ICD-10-CM

## 2021-09-29 DIAGNOSIS — N52.9 MALE ERECTILE DYSFUNCTION, UNSPECIFIED: ICD-10-CM

## 2021-09-29 PROCEDURE — 93000 ELECTROCARDIOGRAM COMPLETE: CPT

## 2021-09-29 PROCEDURE — 99204 OFFICE O/P NEW MOD 45 MIN: CPT

## 2021-09-29 RX ORDER — ZOLPIDEM TARTRATE 5 MG/1
TABLET, FILM COATED ORAL
Refills: 0 | Status: ACTIVE | COMMUNITY

## 2021-09-29 RX ORDER — MULTIVIT-MIN/IRON/FOLIC ACID/K 18-600-40
CAPSULE ORAL
Refills: 0 | Status: ACTIVE | COMMUNITY

## 2021-09-29 RX ORDER — FENOFIBRATE 145 MG/1
145 TABLET, COATED ORAL DAILY
Qty: 90 | Refills: 1 | Status: ACTIVE | COMMUNITY
Start: 1900-01-01 | End: 1900-01-01

## 2021-09-29 RX ORDER — TRAMADOL HYDROCHLORIDE 50 MG/1
50 TABLET, COATED ORAL
Refills: 0 | Status: ACTIVE | COMMUNITY

## 2021-09-29 RX ORDER — ATORVASTATIN CALCIUM 40 MG/1
40 TABLET, FILM COATED ORAL
Refills: 0 | Status: DISCONTINUED | COMMUNITY
End: 2021-09-29

## 2021-09-29 RX ORDER — MONTELUKAST 10 MG/1
10 TABLET, FILM COATED ORAL
Refills: 0 | Status: DISCONTINUED | COMMUNITY
End: 2021-09-29

## 2021-09-29 RX ORDER — YOHIMBE BARK 500 MG
CAPSULE ORAL
Refills: 0 | Status: ACTIVE | COMMUNITY

## 2021-09-29 RX ORDER — MONTELUKAST 10 MG/1
10 TABLET, FILM COATED ORAL
Refills: 0 | Status: ACTIVE | COMMUNITY

## 2021-09-29 RX ORDER — ATORVASTATIN CALCIUM 40 MG/1
40 TABLET, FILM COATED ORAL
Qty: 30 | Refills: 5 | Status: ACTIVE | COMMUNITY
Start: 2021-09-29

## 2021-09-29 RX ORDER — ATORVASTATIN CALCIUM 40 MG/1
40 TABLET, FILM COATED ORAL
Refills: 0 | Status: ACTIVE | COMMUNITY

## 2021-09-29 RX ORDER — LISINOPRIL 5 MG/1
5 TABLET ORAL
Refills: 0 | Status: DISCONTINUED | COMMUNITY
End: 2021-09-29

## 2021-09-29 RX ORDER — GABAPENTIN 300 MG/1
300 CAPSULE ORAL 3 TIMES DAILY
Refills: 0 | Status: ACTIVE | COMMUNITY

## 2021-10-04 NOTE — HISTORY OF PRESENT ILLNESS
[FreeTextEntry1] : 75 year old man with a history hypertension, hyperlipidemia, ectopy, CKD, spinal stenosis presents for an initial for evaluation. \par \par He is here to establish care.  He was formally seeing another cardiologist who moved his practice.\par He has been suffering from back pain that limits his activity. He bowls 2 times a week. He   denies any chest pain, PND, orthopnea, lower extremity edema, near syncope, syncope, strokelike symptoms. He is compliant with his medications.  Thank you he had a stress and echo at his former cardiologist office within the last year.

## 2021-10-04 NOTE — DISCUSSION/SUMMARY
[FreeTextEntry1] : 75 year man with a history as listed presents for an initial cardiac evaluation. \cooper Isaacs is overall doing well.  His back pain limits his exercise tolerance significantly.  He denies any anginal symptoms.  Clinically he is euvolemic on exam.  His EKG does not show any ischemic changes.  He does have marked bradycardia.  I think this is multifactorial in nature.  He was athletic previously but also he is on atenolol therapy in the setting of CKD.  We spoke at length about changing his medications.  But given his controlled blood pressure I will continue current regimen for now. He will get a 2d echo to assess for any  new structural heart disease, changes in valvular and ventricular function. He will get a Holter to rule out arrhythmias.  I will try to get his recent stress test from his former cardiologist.  At your convenience, please fax me his latest lab results including lipid profile. \par He will continue statin therapy.\par Exercise and diet counseling was performed in order to reduce her future cardiovascular risk.\par He will followup with me in 3 months or sooner if necessary.

## 2021-10-12 ENCOUNTER — APPOINTMENT (OUTPATIENT)
Dept: CARDIOLOGY | Facility: CLINIC | Age: 76
End: 2021-10-12
Payer: MEDICARE

## 2021-10-12 PROCEDURE — 93224 XTRNL ECG REC UP TO 48 HRS: CPT

## 2021-10-13 ENCOUNTER — APPOINTMENT (OUTPATIENT)
Dept: CARDIOLOGY | Facility: CLINIC | Age: 76
End: 2021-10-13
Payer: MEDICARE

## 2021-10-13 PROCEDURE — 93306 TTE W/DOPPLER COMPLETE: CPT

## 2021-11-05 LAB
25(OH)D3 SERPL-MCNC: 38.1 NG/ML
ALBUMIN SERPL ELPH-MCNC: 4.6 G/DL
ALP BLD-CCNC: 32 U/L
ALT SERPL-CCNC: 13 U/L
ANION GAP SERPL CALC-SCNC: 12 MMOL/L
AST SERPL-CCNC: 23 U/L
BASOPHILS # BLD AUTO: 0.07 K/UL
BASOPHILS NFR BLD AUTO: 1 %
BILIRUB SERPL-MCNC: 0.4 MG/DL
BUN SERPL-MCNC: 30 MG/DL
CALCIUM SERPL-MCNC: 9.3 MG/DL
CHLORIDE SERPL-SCNC: 101 MMOL/L
CHOLEST SERPL-MCNC: 156 MG/DL
CO2 SERPL-SCNC: 24 MMOL/L
COVID-19 SPIKE DOMAIN ANTIBODY INTERPRETATION: POSITIVE
CREAT SERPL-MCNC: 1.89 MG/DL
EOSINOPHIL # BLD AUTO: 0.35 K/UL
EOSINOPHIL NFR BLD AUTO: 4.8 %
ESTIMATED AVERAGE GLUCOSE: 108 MG/DL
FOLATE SERPL-MCNC: >20 NG/ML
GLUCOSE SERPL-MCNC: 97 MG/DL
HBA1C MFR BLD HPLC: 5.4 %
HCT VFR BLD CALC: 40.4 %
HDLC SERPL-MCNC: 32 MG/DL
HGB BLD-MCNC: 13.2 G/DL
IMM GRANULOCYTES NFR BLD AUTO: 0.3 %
LDLC SERPL CALC-MCNC: 99 MG/DL
LYMPHOCYTES # BLD AUTO: 2.15 K/UL
LYMPHOCYTES NFR BLD AUTO: 29.3 %
MAGNESIUM SERPL-MCNC: 1.8 MG/DL
MAN DIFF?: NORMAL
MCHC RBC-ENTMCNC: 29.5 PG
MCHC RBC-ENTMCNC: 32.7 GM/DL
MCV RBC AUTO: 90.4 FL
MONOCYTES # BLD AUTO: 0.9 K/UL
MONOCYTES NFR BLD AUTO: 12.3 %
NEUTROPHILS # BLD AUTO: 3.85 K/UL
NEUTROPHILS NFR BLD AUTO: 52.3 %
NONHDLC SERPL-MCNC: 124 MG/DL
PLATELET # BLD AUTO: 206 K/UL
POTASSIUM SERPL-SCNC: 4.8 MMOL/L
PROT SERPL-MCNC: 6.8 G/DL
RBC # BLD: 4.47 M/UL
RBC # FLD: 14.4 %
SARS-COV-2 AB SERPL IA-ACNC: >250 U/ML
SODIUM SERPL-SCNC: 137 MMOL/L
TRIGL SERPL-MCNC: 124 MG/DL
TSH SERPL-ACNC: 2.63 UIU/ML
VIT B12 SERPL-MCNC: 514 PG/ML
WBC # FLD AUTO: 7.34 K/UL

## 2021-11-05 RX ORDER — ATENOLOL 25 MG/1
25 TABLET ORAL DAILY
Qty: 90 | Refills: 3 | Status: DISCONTINUED | COMMUNITY
Start: 2021-09-29 | End: 2021-11-05

## 2021-11-05 RX ORDER — MAGNESIUM OXIDE 241.3 MG/1000MG
400 TABLET ORAL
Qty: 90 | Refills: 3 | Status: ACTIVE | COMMUNITY
Start: 1900-01-01 | End: 1900-01-01

## 2022-01-05 NOTE — H&P PST ADULT - SYMPTOMS
Outpatient Care Management Note:    Voice mail message received from Hussain's daughter, Leah Gonzalez, requesting CM call back after 2pm  She states that no nurse has been coming to change Hussain's wounds, he has not received his wheelchair or his "port a potty"  Her contact number is   CM attempted to call [facility]  I got their answering service  [facility] is now [facility]  CM left a message, with my contact information, requesting a call back regarding whether nursing services are still seeing Shashank Sousa  CM called [facility] and spoke with Rosalind Castrejon  They received an order for a commode in December  They left a message for the daughter but did not receive a call back  [facility] does not have an order for a bariatric wheelchair  Voice mail message left for [facility] checking to see what DME company the bariatric wheelchair was sent to  CM left my contact information and requested a call back  none

## 2022-01-21 ENCOUNTER — APPOINTMENT (OUTPATIENT)
Dept: CARDIOLOGY | Facility: CLINIC | Age: 77
End: 2022-01-21
Payer: MEDICARE

## 2022-01-21 ENCOUNTER — NON-APPOINTMENT (OUTPATIENT)
Age: 77
End: 2022-01-21

## 2022-01-21 VITALS
BODY MASS INDEX: 25.05 KG/M2 | DIASTOLIC BLOOD PRESSURE: 84 MMHG | SYSTOLIC BLOOD PRESSURE: 149 MMHG | OXYGEN SATURATION: 97 % | HEIGHT: 70 IN | HEART RATE: 51 BPM | WEIGHT: 175 LBS

## 2022-01-21 VITALS — DIASTOLIC BLOOD PRESSURE: 70 MMHG | SYSTOLIC BLOOD PRESSURE: 128 MMHG

## 2022-01-21 PROCEDURE — 93000 ELECTROCARDIOGRAM COMPLETE: CPT

## 2022-01-21 PROCEDURE — 99214 OFFICE O/P EST MOD 30 MIN: CPT

## 2022-01-21 NOTE — DISCUSSION/SUMMARY
[FreeTextEntry1] : 76 year man with a history as listed presents for a followup cardiac evaluation. \cooper Isaacs is overall doing well.  His back pain limits his exercise tolerance significantly.  He denies any anginal symptoms.  Clinically he is euvolemic on exam.  His EKG does not show any ischemic changes.  He does have marked bradycardia.  Though his Holter did not show any significant heart block.  His average heart rate was 50 bpm.  He transitioned his atenolol to Bystolic given his renal dysfunction.  He is blood pressure is controlled.  I am still awaiting his stress test results from his old cardiologist.  I reviewed his other results with him.\par He will continue statin and fibrate therapy.\par Exercise and diet counseling was performed in order to reduce her future cardiovascular risk.\par He will followup with me in 6 months or sooner if necessary.

## 2022-01-21 NOTE — CARDIOLOGY SUMMARY
[de-identified] : 1/21/22 Marked sinus  Bradycardia  [de-identified] : 11/2021 SB occasional APCS/PVCs [de-identified] : 10/13/21 normal LV function

## 2022-06-21 ENCOUNTER — NON-APPOINTMENT (OUTPATIENT)
Age: 77
End: 2022-06-21

## 2022-06-21 ENCOUNTER — APPOINTMENT (OUTPATIENT)
Dept: CARDIOLOGY | Facility: CLINIC | Age: 77
End: 2022-06-21
Payer: MEDICARE

## 2022-06-21 VITALS
HEART RATE: 73 BPM | WEIGHT: 175 LBS | DIASTOLIC BLOOD PRESSURE: 61 MMHG | HEIGHT: 70 IN | OXYGEN SATURATION: 96 % | BODY MASS INDEX: 25.05 KG/M2 | SYSTOLIC BLOOD PRESSURE: 124 MMHG

## 2022-06-21 DIAGNOSIS — E78.00 PURE HYPERCHOLESTEROLEMIA, UNSPECIFIED: ICD-10-CM

## 2022-06-21 PROCEDURE — 99214 OFFICE O/P EST MOD 30 MIN: CPT

## 2022-06-21 PROCEDURE — 93000 ELECTROCARDIOGRAM COMPLETE: CPT

## 2022-06-21 NOTE — HISTORY OF PRESENT ILLNESS
[FreeTextEntry1] : 76 year old man with a history hypertension, hyperlipidemia, ectopy, CKD, spinal stenosis presents for a followup for evaluation. \par \par Since his last visit, he has been feeling relatively well. He has been suffering from back pain that limits his activity. He recently had an epidural. He bowls 1 times a week. he is walking about 2 miles a few times a week. He   denies any chest pain, dyspnea, PND, orthopnea, lower extremity edema, near syncope, syncope, strokelike symptoms.\par  He is compliant with his medications.  Medication reconciliation performed.

## 2022-06-21 NOTE — DISCUSSION/SUMMARY
[FreeTextEntry1] : 76 year man with a history as listed presents for a followup cardiac evaluation. \par Ferny is overall doing well.  His back pain limits his exercise tolerance significantly.  He denies any anginal symptoms.  Clinically he is euvolemic on exam.  His EKG does not show any ischemic changes.  He does have marked bradycardia.  Though his Holter did not show any significant heart block.  His average heart rate was 50 bpm. but improved with Bystolic. \par He is blood pressure is controlled.  I am still awaiting his stress test results from his old cardiologist.  I reviewed his other results with him.\par He will continue statin and fibrate therapy.\par Exercise and diet counseling was performed in order to reduce her future cardiovascular risk.\par He will followup with me in 6 months or sooner if necessary.

## 2022-06-21 NOTE — CARDIOLOGY SUMMARY
[de-identified] : 1/21/22 Marked sinus  Bradycardia  [de-identified] : 11/2021 SB occasional APCS/PVCs [de-identified] : 10/13/21 normal LV function

## 2022-07-13 DIAGNOSIS — R25.2 CRAMP AND SPASM: ICD-10-CM

## 2022-07-26 ENCOUNTER — APPOINTMENT (OUTPATIENT)
Dept: CARDIOLOGY | Facility: CLINIC | Age: 77
End: 2022-07-26

## 2022-07-26 PROCEDURE — 93925 LOWER EXTREMITY STUDY: CPT

## 2022-11-08 NOTE — H&P PST ADULT - PRO INTERPRETER NEED 2
Phone call received from Nael. Writer introduced self to patient, explained [facility] and informed him that his case will be discussed tomorrow. Writer told Nael he will be called with recommendations and this writer will help coordinate/schedule future appointments. Nael verbalized understanding.     Writer updated patient's smoking history- he states he smoked 2-3ppd for 20 years, quit in 1994.    English

## 2022-12-14 ENCOUNTER — APPOINTMENT (OUTPATIENT)
Dept: CARDIOLOGY | Facility: CLINIC | Age: 77
End: 2022-12-14

## 2022-12-14 ENCOUNTER — NON-APPOINTMENT (OUTPATIENT)
Age: 77
End: 2022-12-14

## 2022-12-14 VITALS
HEIGHT: 70 IN | HEART RATE: 51 BPM | BODY MASS INDEX: 26.34 KG/M2 | SYSTOLIC BLOOD PRESSURE: 153 MMHG | OXYGEN SATURATION: 99 % | DIASTOLIC BLOOD PRESSURE: 80 MMHG | WEIGHT: 184 LBS

## 2022-12-14 VITALS — DIASTOLIC BLOOD PRESSURE: 70 MMHG | SYSTOLIC BLOOD PRESSURE: 130 MMHG

## 2022-12-14 PROCEDURE — 99214 OFFICE O/P EST MOD 30 MIN: CPT

## 2022-12-14 PROCEDURE — 93000 ELECTROCARDIOGRAM COMPLETE: CPT

## 2022-12-14 NOTE — CARDIOLOGY SUMMARY
[de-identified] : 12/14/22  sinus  Bradycardia  [de-identified] : 11/2021 SB occasional APCS/PVCs [de-identified] : 10/13/21 normal LV function

## 2022-12-14 NOTE — DISCUSSION/SUMMARY
[FreeTextEntry1] : 77 year man with a history as listed presents for a followup cardiac evaluation. \par Ferny is overall doing well.  His back pain limits his exercise tolerance significantly.  He now is pendingng spinal surgery. He will get a 2d echo to assess for any  new structural heart disease, changes in valvular and ventricular function. He will undergo a pharmacological nuclear stress test to assess for underlying obstructive CAD. \par He does have marked bradycardia.  Though his Holter did not show any significant heart block. \par He is blood pressure is controlled.  I reviewed his other results with him.\par He will continue statin and fibrate therapy.\par Exercise and diet counseling was performed in order to reduce her future cardiovascular risk.\par He will followup with me in 6 months or sooner if necessary.  [EKG obtained to assist in diagnosis and management of assessed problem(s)] : EKG obtained to assist in diagnosis and management of assessed problem(s)

## 2022-12-14 NOTE — HISTORY OF PRESENT ILLNESS
[FreeTextEntry1] : 77 year old man with a history hypertension, hyperlipidemia, ectopy, CKD, spinal stenosis presents for a followup for evaluation. \par \par Since his last visit, he has been feeling relatively well. He has been suffering from more back pain that limits his activity. He now is pending a spinal surgery with Dr. Langley in Feb 20 at [facility]. \par He bowls 2 times a week.  He   denies any chest pain, dyspnea, PND, orthopnea, lower extremity edema, near syncope, syncope, strokelike symptoms.  He is compliant with his medications.  Medication reconciliation performed.

## 2022-12-21 ENCOUNTER — APPOINTMENT (OUTPATIENT)
Dept: CARDIOLOGY | Facility: CLINIC | Age: 77
End: 2022-12-21

## 2022-12-21 PROCEDURE — 93306 TTE W/DOPPLER COMPLETE: CPT

## 2023-01-18 ENCOUNTER — NON-APPOINTMENT (OUTPATIENT)
Age: 78
End: 2023-01-18

## 2023-01-20 ENCOUNTER — APPOINTMENT (OUTPATIENT)
Dept: CARDIOLOGY | Facility: CLINIC | Age: 78
End: 2023-01-20
Payer: MEDICARE

## 2023-01-20 PROCEDURE — 93015 CV STRESS TEST SUPVJ I&R: CPT

## 2023-01-20 PROCEDURE — A9500: CPT

## 2023-01-20 PROCEDURE — 78452 HT MUSCLE IMAGE SPECT MULT: CPT

## 2023-02-03 ENCOUNTER — NON-APPOINTMENT (OUTPATIENT)
Age: 78
End: 2023-02-03

## 2023-05-03 ENCOUNTER — NON-APPOINTMENT (OUTPATIENT)
Age: 78
End: 2023-05-03

## 2023-05-03 ENCOUNTER — APPOINTMENT (OUTPATIENT)
Dept: CARDIOLOGY | Facility: CLINIC | Age: 78
End: 2023-05-03
Payer: MEDICARE

## 2023-05-03 VITALS
WEIGHT: 180 LBS | SYSTOLIC BLOOD PRESSURE: 131 MMHG | OXYGEN SATURATION: 99 % | DIASTOLIC BLOOD PRESSURE: 70 MMHG | HEART RATE: 54 BPM | HEIGHT: 70 IN | BODY MASS INDEX: 25.77 KG/M2

## 2023-05-03 DIAGNOSIS — R42 DIZZINESS AND GIDDINESS: ICD-10-CM

## 2023-05-03 PROCEDURE — 99214 OFFICE O/P EST MOD 30 MIN: CPT

## 2023-05-03 PROCEDURE — 93000 ELECTROCARDIOGRAM COMPLETE: CPT

## 2023-05-04 PROBLEM — R42 DIZZINESS: Status: ACTIVE | Noted: 2023-05-03

## 2023-05-04 NOTE — HISTORY OF PRESENT ILLNESS
[FreeTextEntry1] : 77 year old man with a history hypertension, hyperlipidemia, ectopy, CKD, spinal stenosis presents for a followup for evaluation. \par \par Since his last visit, he is now s/p spinal fusion. He tolerated it well. He is complaining of feeling cold in his hands and feet. He has been complaining of lightheadedness when bending forward. He does not hydrate well. \par  He   denies any chest pain, dyspnea, PND, orthopnea, lower extremity edema, near syncope, syncope, strokelike symptoms.  He is compliant with his medications.  Medication reconciliation performed.

## 2023-05-04 NOTE — CARDIOLOGY SUMMARY
[de-identified] : 5/3/22 Sinus  Bradycardia  -First degree A-V block  [de-identified] : 11/2021 SB occasional APCS/PVCs [de-identified] : 2/1/23 pharm: spect normal  [de-identified] : 10/13/21 normal LV function \par 12/2022 normal LV function, mod diastolic dysfunction.

## 2023-05-04 NOTE — DISCUSSION/SUMMARY
[FreeTextEntry1] : 77 year man with a history as listed presents for a followup cardiac evaluation. \par Ferny is overall doing well since his surgery. He has more orthostatic symptoms. He will hydrate better. He will check carotids. He does have marked bradycardia.  Though his Holter did not show any significant heart block. \par He is blood pressure is controlled.  I reviewed his other results with him.\par He will continue statin and fibrate therapy.\par Exercise and diet counseling was performed in order to reduce her future cardiovascular risk.\par He will followup with me in 6 months or sooner if necessary.  [EKG obtained to assist in diagnosis and management of assessed problem(s)] : EKG obtained to assist in diagnosis and management of assessed problem(s)

## 2023-05-05 ENCOUNTER — APPOINTMENT (OUTPATIENT)
Dept: CARDIOLOGY | Facility: CLINIC | Age: 78
End: 2023-05-05
Payer: MEDICARE

## 2023-05-05 PROCEDURE — 93880 EXTRACRANIAL BILAT STUDY: CPT

## 2023-08-31 ENCOUNTER — APPOINTMENT (OUTPATIENT)
Dept: CARDIOLOGY | Facility: CLINIC | Age: 78
End: 2023-08-31
Payer: MEDICARE

## 2023-08-31 VITALS
WEIGHT: 170 LBS | BODY MASS INDEX: 24.34 KG/M2 | SYSTOLIC BLOOD PRESSURE: 143 MMHG | HEIGHT: 70 IN | DIASTOLIC BLOOD PRESSURE: 63 MMHG | OXYGEN SATURATION: 96 % | HEART RATE: 56 BPM

## 2023-08-31 PROCEDURE — 99214 OFFICE O/P EST MOD 30 MIN: CPT

## 2023-08-31 PROCEDURE — 93000 ELECTROCARDIOGRAM COMPLETE: CPT

## 2023-08-31 NOTE — DISCUSSION/SUMMARY
[FreeTextEntry1] : 77 year man with a history as listed presents for a followup cardiac evaluation.  Ferny is overall doing well since his surgery. He still has orthostatic symptoms. He will hydrate better.  He does have bradycardia but it has been stable. Though his Holter did not show any significant heart block.  He is blood pressure is controlled. He will continue statin and fibrate therapy. Exercise and diet counseling was performed in order to reduce her future cardiovascular risk. He will follow-up with me in 6 months or sooner if necessary.  [EKG obtained to assist in diagnosis and management of assessed problem(s)] : EKG obtained to assist in diagnosis and management of assessed problem(s)

## 2023-08-31 NOTE — HISTORY OF PRESENT ILLNESS
[FreeTextEntry1] : 77 year old man with a history hypertension, hyperlipidemia, ectopy, CKD, spinal stenosis s/p fusion presents for a followup for evaluation.   Since his last visit, he is battling allergies. He is bowling. He is walking about 1-1.5miles few times a week.  He   denies any chest pain, dyspnea, PND, orthopnea, lower extremity edema, near syncope, syncope, strokelike symptoms.  He is compliant with his medications.  Medication reconciliation performed. Planning to go for a  cruise.

## 2023-08-31 NOTE — CARDIOLOGY SUMMARY
[de-identified] : SB [de-identified] : 11/2021 SB occasional APCS/PVCs [de-identified] : 10/13/21 normal LV function \par  12/2022 normal LV function, mod diastolic dysfunction.  [de-identified] : 2/1/23 pharm: spect normal

## 2023-11-22 ENCOUNTER — EMERGENCY (EMERGENCY)
Facility: HOSPITAL | Age: 78
LOS: 1 days | Discharge: HOME | End: 2023-11-22
Attending: EMERGENCY MEDICINE | Admitting: EMERGENCY MEDICINE
Payer: COMMERCIAL

## 2023-11-22 VITALS
RESPIRATION RATE: 20 BRPM | SYSTOLIC BLOOD PRESSURE: 133 MMHG | WEIGHT: 169.98 LBS | OXYGEN SATURATION: 97 % | TEMPERATURE: 98 F | DIASTOLIC BLOOD PRESSURE: 73 MMHG | HEIGHT: 70 IN | HEART RATE: 71 BPM

## 2023-11-22 DIAGNOSIS — Z98.89 OTHER SPECIFIED POSTPROCEDURAL STATES: Chronic | ICD-10-CM

## 2023-11-22 DIAGNOSIS — Z98.890 OTHER SPECIFIED POSTPROCEDURAL STATES: Chronic | ICD-10-CM

## 2023-11-22 DIAGNOSIS — H26.9 UNSPECIFIED CATARACT: Chronic | ICD-10-CM

## 2023-11-22 PROCEDURE — 73590 X-RAY EXAM OF LOWER LEG: CPT | Mod: 26,RT

## 2023-11-22 PROCEDURE — 99284 EMERGENCY DEPT VISIT MOD MDM: CPT

## 2023-11-22 PROCEDURE — 70450 CT HEAD/BRAIN W/O DYE: CPT | Mod: 26,MA

## 2023-11-22 PROCEDURE — 99284 EMERGENCY DEPT VISIT MOD MDM: CPT | Mod: 25

## 2023-11-22 PROCEDURE — 70450 CT HEAD/BRAIN W/O DYE: CPT | Mod: MA

## 2023-11-22 PROCEDURE — 73590 X-RAY EXAM OF LOWER LEG: CPT

## 2023-11-22 RX ORDER — ACETAMINOPHEN 500 MG
650 TABLET ORAL ONCE
Refills: 0 | Status: COMPLETED | OUTPATIENT
Start: 2023-11-22 | End: 2023-11-22

## 2023-11-22 RX ADMIN — Medication 650 MILLIGRAM(S): at 13:45
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
## 2023-11-22 NOTE — ED ADULT NURSE NOTE - NSICDXPASTMEDICALHX_GEN_ALL_CORE_FT
PAST MEDICAL HISTORY:  Anxiety denied    Childhood asthma resolved    Elbow effusion left    GERD (gastroesophageal reflux disease)     Hypercholesteremia     Hyperlipidemia     Insomnia     Seasonal allergic rhinitis     Strain of muscle, fascia and tendon of long head of biceps, left arm, initial encounter     Unilateral inguinal hernia, without obstruction or gangrene, not specified as recurrent

## 2023-11-22 NOTE — ED PROVIDER NOTE - PROGRESS NOTE DETAILS
CONNIE Gilliam:Head CT results reviewed no acute findings.  X-ray images of the leg reviewed with Dr. Upton–no fractures noted.  Patient remained asymptomatic in the ED.  Will DC with PCP follow-up and return precautions
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
## 2023-11-22 NOTE — ED PROVIDER NOTE - PATIENT PORTAL LINK FT
You can access the FollowMyHealth Patient Portal offered by [facility] by registering at the following website: [email or web address]. By joining [employer]’s FollowMyHealth portal, you will also be able to view your health information using other applications (apps) compatible with our system.
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
## 2024-01-03 ENCOUNTER — RX RENEWAL (OUTPATIENT)
Age: 79
End: 2024-01-03

## 2024-01-03 RX ORDER — TADALAFIL 10 MG/1
10 TABLET, FILM COATED ORAL
Qty: 90 | Refills: 2 | Status: ACTIVE | COMMUNITY
Start: 2024-01-03

## 2024-02-21 ENCOUNTER — NON-APPOINTMENT (OUTPATIENT)
Age: 79
End: 2024-02-21

## 2024-02-21 ENCOUNTER — APPOINTMENT (OUTPATIENT)
Dept: CARDIOLOGY | Facility: CLINIC | Age: 79
End: 2024-02-21
Payer: MEDICARE

## 2024-02-21 VITALS
SYSTOLIC BLOOD PRESSURE: 173 MMHG | DIASTOLIC BLOOD PRESSURE: 83 MMHG | HEART RATE: 49 BPM | BODY MASS INDEX: 26.05 KG/M2 | OXYGEN SATURATION: 99 % | HEIGHT: 70 IN | WEIGHT: 182 LBS

## 2024-02-21 PROCEDURE — 99214 OFFICE O/P EST MOD 30 MIN: CPT

## 2024-02-21 PROCEDURE — G2211 COMPLEX E/M VISIT ADD ON: CPT

## 2024-02-21 PROCEDURE — 93000 ELECTROCARDIOGRAM COMPLETE: CPT

## 2024-02-21 RX ORDER — AMLODIPINE BESYLATE 5 MG/1
5 TABLET ORAL DAILY
Qty: 90 | Refills: 2 | Status: ACTIVE | COMMUNITY
Start: 2024-02-21 | End: 1900-01-01

## 2024-02-21 NOTE — DISCUSSION/SUMMARY
[FreeTextEntry1] : 77 year man with a history as listed presents for a followup cardiac evaluation.  Ferny is overall doing well. He denies any anginal symptoms. Clinically he is euvolemic on exam. His EKG did not reveal any significant ischemic changes.  His lightheadedness has improved. He does have bradycardia but it has been stable. Though his Holter did not show any significant heart block. I would decrease the bystolic to 5mg Qday and add Norvasc 5mg Qday.  He will continue statin and fibrate therapy. Exercise and diet counseling was performed in order to reduce her future cardiovascular risk. He will follow-up with me in 3-4 months or sooner if necessary.  [EKG obtained to assist in diagnosis and management of assessed problem(s)] : EKG obtained to assist in diagnosis and management of assessed problem(s)

## 2024-02-21 NOTE — CARDIOLOGY SUMMARY
[de-identified] : SB [de-identified] : 11/2021 SB occasional APCS/PVCs [de-identified] : 2/1/23 pharm: spect normal  [de-identified] : 10/13/21 normal LV function \par  12/2022 normal LV function, mod diastolic dysfunction.  [de-identified] : 5/2023  Mild atherosclerosis without evidence of hemodynamically significant stenosis bilaterally.

## 2024-02-21 NOTE — HISTORY OF PRESENT ILLNESS
[FreeTextEntry1] : 78 year old man with a history hypertension, hyperlipidemia, ectopy, CKD, spinal stenosis s/p fusion presents for a follow-up for evaluation.   Since his last visit, he was found to have iron deficiency anemia. His Hb was 8.8. He needed iron infusions. He had a reportedly negative EGD/colonoscopy. Though has a hiatal hernia. His hb is >10 now.   He is bowling about 2 times a week. He denies any chest pain, dyspnea, PND, orthopnea, lower extremity edema, near syncope, syncope, stroke like symptoms.  He is compliant with his medications.  Medication reconciliation performed.

## 2024-04-03 NOTE — ASU DISCHARGE PLAN (ADULT/PEDIATRIC) - ACTIVITY LEVEL
Patient : Chevalier Armstrong Age: 40 year old Sex: male   MRN: [id number] Encounter Date: 4/3/2024      History     Chief Complaint   Patient presents with    Shortness of Breath     HPI    41y/o M presenting to the ED with shortness of breath x2 weeks.  Patient endorses a productive cough for the past 2 weeks.  He states he does feel short of breath constantly but worsened with coughing.  He reports midline chest pain with deep inspiration.  He additionally reports intermittent left flank pain with cough and exertion.  Patient states he had a fever when his symptoms 1st started but that has resolved.  Patient states that his current symptoms feel similar to when he was seen in the Emergency Department in February.  He states the albuterol inhaler that was given to him was helpful.  Patient reports a family history of asthma but denies any personal history of asthma.  Denies recent travel, hospitalizations, or surgeries.  Denies leg edema or calf pain, hemoptysis, history of DVT or PE, past cardiac history.    Allergies   Allergen Reactions    Coconut SWELLING    Pineapple SWELLING    Strawberries [Strawberry] SWELLING       Current Discharge Medication List        Prior to Admission Medications    Details   Aspirin-Acetaminophen-Caffeine (EXCEDRIN PO) Take 1 tablet by mouth as needed.             Past Medical History:   Diagnosis Date    Depressive disorder        No past surgical history on file.    Family History   Problem Relation Age of Onset    Hypertension Mother     Diabetes Father        Social History     Tobacco Use    Smoking status: Some Days     Current packs/day: 0.25     Types: Cigarettes     Passive exposure: Yes   Substance Use Topics    Alcohol use: Yes     Comment: none    Drug use: Yes     Types: Marijuana, Cocaine     Comment: no cocaine, last use 3 weeks ago.       E-cigarette/Vaping     E-Cigarette/Vaping Substances & Devices       Review of Systems   Constitutional:  Negative for chills and  fever.   HENT:  Negative for sore throat.    Respiratory:  Positive for cough and shortness of breath. Negative for wheezing and stridor.    Cardiovascular:  Positive for chest pain. Negative for palpitations and leg swelling.   Gastrointestinal:  Negative for abdominal pain, diarrhea, nausea and vomiting.   Genitourinary:  Negative for dysuria, frequency and hematuria.       Physical Exam     ED Triage Vitals [04/02/24 2308]   ED Triage Vitals Group      Temp 97.6 °F (36.4 °C)      Heart Rate (!) 109      Resp 20      BP (!) 153/100      SpO2 100 %      EtCO2 mmHg       Height 5' 11\" (1.803 m)      Weight 235 lb 7.2 oz (106.8 kg)      Weight Scale Used Standing scale      BMI (Calculated) 32.84      IBW/kg (Calculated) 75.3       Physical Exam  Constitutional:       General: He is not in acute distress.     Appearance: He is well-developed. He is not ill-appearing, toxic-appearing or diaphoretic.   Cardiovascular:      Rate and Rhythm: Regular rhythm. Tachycardia present.      Pulses: Normal pulses.      Heart sounds: Normal heart sounds. No murmur heard.     No friction rub. No gallop.   Pulmonary:      Effort: Pulmonary effort is normal. No respiratory distress.      Breath sounds: Normal breath sounds. No stridor. No wheezing, rhonchi or rales.   Skin:     General: Skin is warm and dry.   Neurological:      Mental Status: He is alert.         ED Course     Procedures    Lab Results     Results for orders placed or performed during the hospital encounter of 04/03/24   Comprehensive Metabolic Panel   Result Value Ref Range    Fasting Status      Sodium 139 135 - 145 mmol/L    Potassium 4.3 3.4 - 5.1 mmol/L    Chloride 104 97 - 110 mmol/L    Carbon Dioxide 26 21 - 32 mmol/L    Anion Gap 13 7 - 19 mmol/L    Glucose 102 (H) 70 - 99 mg/dL    BUN 14 6 - 20 mg/dL    Creatinine 0.79 0.67 - 1.17 mg/dL    Glomerular Filtration Rate >90 >=60    BUN/Cr 18 7 - 25    Calcium 8.7 8.4 - 10.2 mg/dL    Bilirubin, Total 0.6 0.2 -  1.0 mg/dL    GOT/AST 17 <=37 Units/L    GPT/ALT 19 <64 Units/L    Alkaline Phosphatase 63 45 - 117 Units/L    Albumin 3.5 (L) 3.6 - 5.1 g/dL    Protein, Total 7.6 6.4 - 8.2 g/dL    Globulin 4.1 (H) 2.0 - 4.0 g/dL    A/G Ratio 0.9 (L) 1.0 - 2.4   TROPONIN I, HIGH SENSITIVITY   Result Value Ref Range    Troponin I, High Sensitivity <4 <77 ng/L   D Dimer, Quantitative   Result Value Ref Range    D Dimer, Quantitative 0.31 <0.57 mg/L (FEU)   CBC with Automated Differential (performable only)   Result Value Ref Range    WBC 5.0 4.2 - 11.0 K/mcL    RBC 5.21 4.50 - 5.90 mil/mcL    HGB 15.4 13.0 - 17.0 g/dL    HCT 45.0 39.0 - 51.0 %    MCV 86.4 78.0 - 100.0 fl    MCH 29.6 26.0 - 34.0 pg    MCHC 34.2 32.0 - 36.5 g/dL    RDW-CV 12.8 11.0 - 15.0 %    RDW-SD 40.1 39.0 - 50.0 fL     140 - 450 K/mcL    NRBC 0 <=0 /100 WBC    Neutrophil, Percent 63 %    Lymphocytes, Percent 30 %    Mono, Percent 7 %    Eosinophils, Percent 0 %    Basophils, Percent 0 %    Immature Granulocytes 0 %    Absolute Neutrophils 3.1 1.8 - 7.7 K/mcL    Absolute Lymphocytes 1.5 1.0 - 4.8 K/mcL    Absolute Monocytes 0.4 0.3 - 0.9 K/mcL    Absolute Eosinophils  0.0 0.0 - 0.5 K/mcL    Absolute Basophils 0.0 0.0 - 0.3 K/mcL    Absolute Immature Granulocytes 0.0 0.0 - 0.2 K/mcL       EKG Results     EKG Interpretation  Rate: 80  Rhythm: NSR  Abnormality: Incomplete RBBB    EKG tracing interpreted by ED physician: Dr. Valdez    Radiology Results     Imaging Results              XR CHEST PA AND LATERAL 2 VIEWS (Final result)  Result time 04/03/24 03:52:37      Final result                   Impression:    IMPRESSION:    No acute cardiopulmonary disease.    Electronically Signed by: Harrison Hagan MD  Signed on: 4/3/2024 3:52 AM  Created on Workstation ID: [id number]  Signed on Workstation ID: [id number]               Narrative:    EXAM: XR CHEST PA AND LATERAL 2 VIEWS    CLINICAL INDICATION: SOB, cough, chest pain.    TECHNIQUE:  Frontal and lateral views of  the chest were obtained.    COMPARISON: None.    FINDINGS:    The cardiomediastinal silhouette appears to be within normal limits. The  trachea is midline. The hilar contours are normal. The pulmonary  vasculature is normally distributed. The lungs and pleural spaces are  clear.                                       ED Medication Orders (From admission, onward)      None                 Medical Decision Making    41y/o M presenting to the ED with shortness of breath x2 weeks. Pt is well-appearing, nontoxic, no acute distress. Vital signs are normal. Lungs are CTAB. ECG shows an incomplete RBBB. Troponin negative. D-dimer is not significantly elevated. Doubt PE. CXR normal. Discussed findings with the patient. A referral to family practice was placed for follow-up.    I discussed the possible diagnoses with the patient as well as the warning signs and symptoms. The patient understands that this is a provisional diagnosis which can and do change. The diagnosis that the patient was discharged with today is based on the symptoms with which they presented. If any new symptoms occur or if symptoms worsen, the patient understands that they must seek immediate attention for re-evaluation. Patient was also provided with discharge instructions and follow up information. Patient is to to follow up with PCP in 1 day with regard to all of the above medical problems. Patient expressed their understanding and agrees with plan for discharge. All questions have been answered.      Supervising physician: Dr. Jeffery    Clinical Impression     ED Diagnosis   1. Cough, unspecified type  SERVICE TO FAMILY PRACTICE      2. Chest pain, unspecified type  SERVICE TO FAMILY PRACTICE      3. Incomplete right bundle branch block  SERVICE TO FAMILY PRACTICE      4. Shortness of breath            Disposition        Discharge 4/3/2024  4:32 AM  Chevalier Armstrong discharge to home/self care.             Dasha Nguyen PA-C  04/03/24 0438     No excercise/No heavy lifting/No sports/gym

## 2024-05-23 RX ORDER — NEBIVOLOL 5 MG/1
5 TABLET ORAL
Qty: 90 | Refills: 2 | Status: ACTIVE | COMMUNITY
Start: 2021-11-05

## 2024-06-26 ENCOUNTER — APPOINTMENT (OUTPATIENT)
Dept: CARDIOLOGY | Facility: CLINIC | Age: 79
End: 2024-06-26
Payer: MEDICARE

## 2024-06-26 ENCOUNTER — NON-APPOINTMENT (OUTPATIENT)
Age: 79
End: 2024-06-26

## 2024-06-26 VITALS
BODY MASS INDEX: 24.62 KG/M2 | SYSTOLIC BLOOD PRESSURE: 131 MMHG | OXYGEN SATURATION: 98 % | HEART RATE: 64 BPM | WEIGHT: 172 LBS | DIASTOLIC BLOOD PRESSURE: 74 MMHG | HEIGHT: 70 IN

## 2024-06-26 VITALS — SYSTOLIC BLOOD PRESSURE: 118 MMHG | DIASTOLIC BLOOD PRESSURE: 60 MMHG

## 2024-06-26 DIAGNOSIS — I10 ESSENTIAL (PRIMARY) HYPERTENSION: ICD-10-CM

## 2024-06-26 DIAGNOSIS — R00.1 BRADYCARDIA, UNSPECIFIED: ICD-10-CM

## 2024-06-26 PROCEDURE — 93000 ELECTROCARDIOGRAM COMPLETE: CPT

## 2024-06-26 PROCEDURE — 99214 OFFICE O/P EST MOD 30 MIN: CPT

## 2024-06-26 PROCEDURE — G2211 COMPLEX E/M VISIT ADD ON: CPT

## 2024-09-29 RX ORDER — ROSUVASTATIN CALCIUM 40 MG/1
40 TABLET, FILM COATED ORAL
Qty: 90 | Refills: 2 | Status: ACTIVE | COMMUNITY
Start: 2024-09-27

## 2024-11-14 ENCOUNTER — RX RENEWAL (OUTPATIENT)
Age: 79
End: 2024-11-14

## 2024-12-18 ENCOUNTER — NON-APPOINTMENT (OUTPATIENT)
Age: 79
End: 2024-12-18

## 2024-12-18 ENCOUNTER — APPOINTMENT (OUTPATIENT)
Dept: CARDIOLOGY | Facility: CLINIC | Age: 79
End: 2024-12-18
Payer: MEDICARE

## 2024-12-18 VITALS
HEART RATE: 57 BPM | WEIGHT: 172 LBS | OXYGEN SATURATION: 96 % | BODY MASS INDEX: 24.62 KG/M2 | SYSTOLIC BLOOD PRESSURE: 128 MMHG | DIASTOLIC BLOOD PRESSURE: 74 MMHG | HEIGHT: 70 IN

## 2024-12-18 VITALS — SYSTOLIC BLOOD PRESSURE: 118 MMHG | DIASTOLIC BLOOD PRESSURE: 70 MMHG

## 2024-12-18 DIAGNOSIS — G47.33 OBSTRUCTIVE SLEEP APNEA (ADULT) (PEDIATRIC): ICD-10-CM

## 2024-12-18 PROCEDURE — 93000 ELECTROCARDIOGRAM COMPLETE: CPT

## 2024-12-18 PROCEDURE — G2211 COMPLEX E/M VISIT ADD ON: CPT

## 2024-12-18 PROCEDURE — 99214 OFFICE O/P EST MOD 30 MIN: CPT

## 2024-12-24 NOTE — ED ADULT TRIAGE NOTE - AS O2 DELIVERY
Health Maintenance       COVID-19 Vaccine (5 - 2024-25 season)  Overdue since 9/1/2024           Following review of the above:  Patient is not proceeding with: COVID-19    Note: Refer to final orders and clinician documentation.       room air

## 2025-06-09 ENCOUNTER — RX RENEWAL (OUTPATIENT)
Age: 80
End: 2025-06-09

## 2025-06-17 ENCOUNTER — APPOINTMENT (OUTPATIENT)
Dept: CARDIOLOGY | Facility: CLINIC | Age: 80
End: 2025-06-17
Payer: MEDICARE

## 2025-06-17 VITALS
SYSTOLIC BLOOD PRESSURE: 145 MMHG | HEIGHT: 70 IN | BODY MASS INDEX: 24.91 KG/M2 | DIASTOLIC BLOOD PRESSURE: 78 MMHG | HEART RATE: 57 BPM | OXYGEN SATURATION: 99 % | WEIGHT: 174 LBS

## 2025-06-17 VITALS — DIASTOLIC BLOOD PRESSURE: 70 MMHG | SYSTOLIC BLOOD PRESSURE: 134 MMHG

## 2025-06-17 PROCEDURE — G2211 COMPLEX E/M VISIT ADD ON: CPT

## 2025-06-17 PROCEDURE — 93000 ELECTROCARDIOGRAM COMPLETE: CPT

## 2025-06-17 PROCEDURE — 99214 OFFICE O/P EST MOD 30 MIN: CPT
